# Patient Record
Sex: MALE | Race: WHITE | NOT HISPANIC OR LATINO | Employment: OTHER | URBAN - METROPOLITAN AREA
[De-identification: names, ages, dates, MRNs, and addresses within clinical notes are randomized per-mention and may not be internally consistent; named-entity substitution may affect disease eponyms.]

---

## 2024-05-21 DIAGNOSIS — Z98.890 H/O EXCISION OF LAMINA OF LUMBAR VERTEBRA FOR DECOMPRESSION OF SPINAL CORD: ICD-10-CM

## 2024-05-21 DIAGNOSIS — Q05.9 MYELOMENINGOCELE (MULTI): Primary | ICD-10-CM

## 2024-05-21 DIAGNOSIS — M54.42 CHRONIC LOW BACK PAIN WITH LEFT-SIDED SCIATICA, UNSPECIFIED BACK PAIN LATERALITY: ICD-10-CM

## 2024-05-21 DIAGNOSIS — G89.29 CHRONIC LOW BACK PAIN WITH LEFT-SIDED SCIATICA, UNSPECIFIED BACK PAIN LATERALITY: ICD-10-CM

## 2024-06-04 ENCOUNTER — HOSPITAL ENCOUNTER (OUTPATIENT)
Dept: RADIOLOGY | Facility: HOSPITAL | Age: 59
Discharge: HOME | End: 2024-06-04
Payer: MEDICARE

## 2024-06-04 DIAGNOSIS — Z98.890 H/O EXCISION OF LAMINA OF LUMBAR VERTEBRA FOR DECOMPRESSION OF SPINAL CORD: ICD-10-CM

## 2024-06-04 DIAGNOSIS — M54.42 CHRONIC LOW BACK PAIN WITH LEFT-SIDED SCIATICA, UNSPECIFIED BACK PAIN LATERALITY: ICD-10-CM

## 2024-06-04 DIAGNOSIS — Q05.9 MYELOMENINGOCELE (MULTI): ICD-10-CM

## 2024-06-04 DIAGNOSIS — G89.29 CHRONIC LOW BACK PAIN WITH LEFT-SIDED SCIATICA, UNSPECIFIED BACK PAIN LATERALITY: ICD-10-CM

## 2024-06-04 PROCEDURE — 72192 CT PELVIS W/O DYE: CPT | Performed by: RADIOLOGY

## 2024-06-04 PROCEDURE — 72082 X-RAY EXAM ENTIRE SPI 2/3 VW: CPT | Mod: MUE

## 2024-06-04 PROCEDURE — 77073 BONE LENGTH STUDIES: CPT | Performed by: STUDENT IN AN ORGANIZED HEALTH CARE EDUCATION/TRAINING PROGRAM

## 2024-06-04 PROCEDURE — 72082 X-RAY EXAM ENTIRE SPI 2/3 VW: CPT

## 2024-06-04 PROCEDURE — 72192 CT PELVIS W/O DYE: CPT

## 2024-06-11 ENCOUNTER — OFFICE VISIT (OUTPATIENT)
Dept: NEUROSURGERY | Facility: CLINIC | Age: 59
End: 2024-06-11
Payer: MEDICARE

## 2024-06-11 VITALS — HEIGHT: 70 IN | WEIGHT: 210 LBS | HEART RATE: 96 BPM | TEMPERATURE: 97.8 F | BODY MASS INDEX: 30.06 KG/M2

## 2024-06-11 DIAGNOSIS — M53.3 SACROILIAC JOINT DYSFUNCTION OF BOTH SIDES: ICD-10-CM

## 2024-06-11 DIAGNOSIS — R29.898 WEAKNESS OF LEFT LOWER EXTREMITY: Primary | ICD-10-CM

## 2024-06-11 DIAGNOSIS — M54.16 LEFT LUMBAR RADICULOPATHY: ICD-10-CM

## 2024-06-11 PROCEDURE — 1036F TOBACCO NON-USER: CPT | Performed by: NEUROLOGICAL SURGERY

## 2024-06-11 PROCEDURE — 99214 OFFICE O/P EST MOD 30 MIN: CPT | Performed by: NEUROLOGICAL SURGERY

## 2024-06-11 RX ORDER — TEMAZEPAM 30 MG/1
CAPSULE ORAL NIGHTLY PRN
COMMUNITY
Start: 2023-09-27

## 2024-06-11 RX ORDER — CYCLOBENZAPRINE HCL 10 MG
10 TABLET ORAL 2 TIMES DAILY PRN
COMMUNITY
Start: 2023-09-27

## 2024-06-11 RX ORDER — METOPROLOL SUCCINATE 25 MG/1
25 TABLET, EXTENDED RELEASE ORAL
COMMUNITY
Start: 2024-04-11

## 2024-06-11 RX ORDER — ALLOPURINOL 300 MG/1
300 TABLET ORAL
COMMUNITY
Start: 2024-04-11

## 2024-06-11 RX ORDER — LEVOTHYROXINE SODIUM 50 UG/1
50 TABLET ORAL
COMMUNITY
Start: 2024-04-11

## 2024-06-11 RX ORDER — DICLOFENAC SODIUM 100 MG/1
1 TABLET, EXTENDED RELEASE ORAL
COMMUNITY
Start: 2024-04-20

## 2024-06-11 RX ORDER — ROSUVASTATIN CALCIUM 20 MG/1
1 TABLET, COATED ORAL
COMMUNITY
Start: 2024-04-20

## 2024-06-11 RX ORDER — LEVOTHYROXINE SODIUM 25 UG/1
25 TABLET ORAL
COMMUNITY
Start: 2024-04-11

## 2024-06-11 RX ORDER — AMLODIPINE BESYLATE 5 MG/1
5 TABLET ORAL 2 TIMES DAILY
COMMUNITY
Start: 2022-12-06

## 2024-06-11 ASSESSMENT — PATIENT HEALTH QUESTIONNAIRE - PHQ9
1. LITTLE INTEREST OR PLEASURE IN DOING THINGS: NOT AT ALL
SUM OF ALL RESPONSES TO PHQ9 QUESTIONS 1 & 2: 0
2. FEELING DOWN, DEPRESSED OR HOPELESS: NOT AT ALL

## 2024-06-11 ASSESSMENT — PAIN SCALES - GENERAL: PAINLEVEL: 9

## 2024-06-11 NOTE — PROGRESS NOTES
It was a pleasure to see Mr. Dow at the [facility] at [facility].     Patient is a 59-year-old male with prior history of myelomeningocele status postrepair with chronic left lower extremity weakness and bilateral lower extremity distal weakness who presents to see us after a few years with back pain and lower extremity radicular complaints.  Majority issues he states are in the left lower extremity but he also has some right lower extremity radicular complaints recently.  He feels that his left leg is weaker than it was before.  He has chronic pain complaints in bilateral upper extremity as well and neck pain.  He has been experiencing a intermittent popping sensation in his lower back on both sides.  This tends to occur when he is going from sitting to standing.  There is some associated pain with this.    Part of this patient’s history is from personal review of the patient’s previous charts. I also personally reviewed the past imaging and interpreted it myself.    Physical Exam:  General: Well developed, awake/alert/oriented x3, no distress, alert and cooperative    Skin: Warm and dry, no lesions, no rashes    ENMT: Mucous membranes moist, no apparent injury, no lesions seen    Head/Neck: Neck Supple, no apparent injury    Respiratory/Thorax: Normal breath sounds with good chest expansion, thorax symmetric    Cardiovascular: No pitting edema, no JVD    Motor strength in bilateral upper extremities 4+ out of 5.  Right lower extremity proximally is 4 out of 5 distally 0 out of 5.  Left lower extremity has a brace in place with some knee extension movement otherwise he is 0 out of 5.  He uses crutches at baseline.    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    Standing EOS films do not show global malalignment taking into consideration patient's left lower extremity  issues.    Assessment and Plan:   Patient is a 59-year-old male with prior history of myelomeningocele repair who presents to see us for back pain and lower EXTR radicular issues.  At this point we do not have any updated imaging to evaluate to see if he has any persistent spinal cord compression given his prior cervical spine surgery or low lumbar compression explaining his radicular issues.  At this point we will obtain MRI of the cervical thoracic and lumbar spine. We will also get EMG studies for left lower extremity weakness. We will also get an physical therapy prescription.  If the popping sensation returns again, we may consider referring him for sacroiliac joint injections.  All questions were answered.

## 2024-07-10 ENCOUNTER — APPOINTMENT (OUTPATIENT)
Dept: RADIOLOGY | Facility: CLINIC | Age: 59
End: 2024-07-10
Payer: MEDICARE

## 2024-07-10 ENCOUNTER — EVALUATION (OUTPATIENT)
Dept: PHYSICAL THERAPY | Facility: CLINIC | Age: 59
End: 2024-07-10
Payer: MEDICARE

## 2024-07-10 DIAGNOSIS — M25.511 CHRONIC PAIN OF BOTH SHOULDERS: Primary | ICD-10-CM

## 2024-07-10 DIAGNOSIS — M25.512 CHRONIC PAIN OF BOTH SHOULDERS: Primary | ICD-10-CM

## 2024-07-10 DIAGNOSIS — R29.898 WEAKNESS OF LEFT LOWER EXTREMITY: ICD-10-CM

## 2024-07-10 DIAGNOSIS — M12.812 OTHER SPECIFIC ARTHROPATHIES, NOT ELSEWHERE CLASSIFIED, LEFT SHOULDER: ICD-10-CM

## 2024-07-10 DIAGNOSIS — G89.29 CHRONIC PAIN OF BOTH SHOULDERS: Primary | ICD-10-CM

## 2024-07-10 PROCEDURE — 97110 THERAPEUTIC EXERCISES: CPT | Mod: GP | Performed by: PHYSICAL THERAPIST

## 2024-07-10 PROCEDURE — 97163 PT EVAL HIGH COMPLEX 45 MIN: CPT | Mod: GP | Performed by: PHYSICAL THERAPIST

## 2024-07-10 ASSESSMENT — COLUMBIA-SUICIDE SEVERITY RATING SCALE - C-SSRS
1. IN THE PAST MONTH, HAVE YOU WISHED YOU WERE DEAD OR WISHED YOU COULD GO TO SLEEP AND NOT WAKE UP?: NO
2. HAVE YOU ACTUALLY HAD ANY THOUGHTS OF KILLING YOURSELF?: NO
6. HAVE YOU EVER DONE ANYTHING, STARTED TO DO ANYTHING, OR PREPARED TO DO ANYTHING TO END YOUR LIFE?: NO

## 2024-07-10 ASSESSMENT — PATIENT HEALTH QUESTIONNAIRE - PHQ9
2. FEELING DOWN, DEPRESSED OR HOPELESS: NOT AT ALL
SUM OF ALL RESPONSES TO PHQ9 QUESTIONS 1 AND 2: 0
1. LITTLE INTEREST OR PLEASURE IN DOING THINGS: NOT AT ALL

## 2024-07-10 ASSESSMENT — ENCOUNTER SYMPTOMS
LOSS OF SENSATION IN FEET: 1
DEPRESSION: 0
OCCASIONAL FEELINGS OF UNSTEADINESS: 1

## 2024-07-10 NOTE — PROGRESS NOTES
PHYSICAL THERAPY   EVALUATION & TREATMENT & DISCHARGE    Patient Name:  Romulo Dow   Patient MRN: [id number]  Date: 7/10/2024    Time Calculation  Start Time: 0120  Stop Time: 0230  Time Calculation (min): 70 min    Insurance:  Insurance Type: [place] [employer] /[employer]  Visit number: 1  Approved # of visits ?  Authorization Needed: Yes  Cert Date: ?     General   Reason for visit: B shoulder pain R>L, L LE weakness  Referred by: Torsten / Corina Lew diagnoses:   Problem List Items Addressed This Visit             ICD-10-CM    Weakness of left lower extremity R29.898    Relevant Orders    Follow Up In Physical Therapy    Chronic pain of both shoulders - Primary M25.511, G89.29, M25.512    Relevant Orders    Follow Up In Physical Therapy     Other Visit Diagnoses         Codes    Other specific arthropathies, not elsewhere classified, left shoulder     M12.812            ASSESSMENT   60 y/o male with spinal bifida and complex surgical history c/o B shoulder pain R>L, and LE weakness L >R, presenting to PT today on forearm crutches and B LE bracing, with MRI showing multiple full thickness R RTC tears and plans for full spinal MRI Friday. He demonstrates severely altered gait pattern, decreased strength in midback, affecting his ability to perform most of his ADLs. He admits that he is not in a place currently to be consistent with PT attendance and getting to the facility would be too hard for him on a regular basis. He is not appropriate for orthopedic therapy at this time - should follow up with neuro PT to address spine / LE weakness and was given HEP to address shoulder weakness in salvage manner.     PLAN   Discharge today with HEP. Follow up with neuro PT when able to be consistent.  Patient agrees to plan of care.    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 Was having spinal issues for years and now has 3 spinal MRIs scheduled, has an EMG also scheduled. Also has B shoulder issues and weakness, mid Feb he got weak enough in the R shoulder that he couldn't even lift a coffee cup. Was refinishing hardwood floors at the time, was scrubbing by hand and that seemed to put him over the edge. Has done PT in the past but his case is too complex and he has too much going on for him to be consistent. Doctor doesn't believe any kind of surgical intervention is an option for his R shoulder due to his gait pattern.   MRI R shoulder - long head biceps tear, full thickness tear of supra, infra, subscap, and labrum    Pain:  R shoulder with N/T down his UE. L side isn't as bad but same symptoms. Neck pain at times   At worst, pain is 8/10  Exacerbating factors include laying down at night    Function:  Can't hold his arm up enough to comb his hair, cleans less than he used to.  Sleep - has a hard time laying on his R shoulder so it wakes him up at night.  Lives alone.   Baseline function: Prior to Feb was doing better but still had some shoulder pain and weakness, but February pushed it over the edge.    Work: disability    Social:   Exercise - none right now, but is a member at the [employer]    Pt goals: strengthen the muscles in his arms to compensate for his shoulder degeneration, strengthen his legs     Language: English  Potential barriers to treatment: pt feels limited in being consistent with his HEP.     Precautions:    PMH: Back surgery 2020 and another prior. RTC tear 25 years ago that he's lived with, spina bifida with R AFO and L KAFO  Fall risk -  high      OBJECTIVE *=painful  Gait Ambulates with B forearm crutches and L LE brace and L leg circumduction    Palpation  (+) TTP R biceps and shoulder    Sensation  Decreased to light touch R thumb  Minimal sensation to light touch to lower legs    Range of Motion (R, L in degrees)  Shoulder Flexion WNL  B  Shoulder Abduction WNL B with pain and depressed shoulder   Hand Up Back  T8, T8  Shoulder ER AROM limited    Strength (R, L MMT out of 5)  Shoulder Flexion 4*, 5  Shoulder Abduction 4*, 5  Shoulder IR 5, 5  Shoulder ER 3*, 5  Elbow Flexion 3+*, 5  Hip Flexion 3+, 3    Outcome Measures  Quick DASH = 29/55    Today's treatment and initial evaluation included:  - Patient education regarding diagnosis, prognosis, contributing factors, comorbidities, activity modification, symptom monitoring, importance of HEP, role of PT, postural re-education, body mechanics, reviewed office cancel/no show policy.  - Review of POC   - Therapeutic Exercise & given HEP handout & yellow, blue, and green band:  Access Code: [email or web address]  - Seated Shoulder Horizontal Abduction with Resistance  - 1 x daily - 2 sets - 15 reps  - Standing Shoulder External Rotation with Resistance  - 1 x daily - 2 sets - 15 reps  - Standing Shoulder Row with Anchored Resistance  - 1 x daily - 2 sets - 15 reps  - Shoulder extension with resistance - Neutral  - 1 x daily - 2 sets - 15 reps  - Seated Shoulder Scaption  - 1 x daily - 2 sets - 15 reps  - Supine March  - 1 x daily - 2 sets - 10 reps  - Supine Bridge  - 1 x daily - 2 sets - 10 reps  PT Evaluation Time Entry  PT Evaluation (Complex) Time Entry: 45  PT Therapeutic Procedures Time Entry  Therapeutic Exercise Time Entry: 25

## 2024-07-12 ENCOUNTER — HOSPITAL ENCOUNTER (OUTPATIENT)
Dept: RADIOLOGY | Facility: CLINIC | Age: 59
Discharge: HOME | End: 2024-07-12
Payer: MEDICARE

## 2024-07-12 DIAGNOSIS — R29.898 WEAKNESS OF LEFT LOWER EXTREMITY: ICD-10-CM

## 2024-07-12 PROCEDURE — 72146 MRI CHEST SPINE W/O DYE: CPT

## 2024-07-12 PROCEDURE — 72148 MRI LUMBAR SPINE W/O DYE: CPT

## 2024-07-12 PROCEDURE — 72141 MRI NECK SPINE W/O DYE: CPT

## 2024-09-17 ENCOUNTER — APPOINTMENT (OUTPATIENT)
Dept: NEUROSURGERY | Facility: CLINIC | Age: 59
End: 2024-09-17
Payer: MEDICARE

## 2024-09-17 VITALS — HEIGHT: 70 IN | WEIGHT: 200 LBS | HEART RATE: 84 BPM | TEMPERATURE: 96.9 F | BODY MASS INDEX: 28.63 KG/M2

## 2024-09-17 DIAGNOSIS — M54.16 LUMBAR RADICULOPATHY: ICD-10-CM

## 2024-09-17 DIAGNOSIS — M48.061 DEGENERATIVE LUMBAR SPINAL STENOSIS: Primary | ICD-10-CM

## 2024-09-17 RX ORDER — EZETIMIBE 10 MG/1
10 TABLET ORAL DAILY
COMMUNITY
Start: 2024-09-03

## 2024-09-17 ASSESSMENT — PATIENT HEALTH QUESTIONNAIRE - PHQ9
SUM OF ALL RESPONSES TO PHQ9 QUESTIONS 1 & 2: 3
8. MOVING OR SPEAKING SO SLOWLY THAT OTHER PEOPLE COULD HAVE NOTICED. OR THE OPPOSITE, BEING SO FIGETY OR RESTLESS THAT YOU HAVE BEEN MOVING AROUND A LOT MORE THAN USUAL: NOT AT ALL
5. POOR APPETITE OR OVEREATING: NOT AT ALL
6. FEELING BAD ABOUT YOURSELF - OR THAT YOU ARE A FAILURE OR HAVE LET YOURSELF OR YOUR FAMILY DOWN: NOT AT ALL
SUM OF ALL RESPONSES TO PHQ QUESTIONS 1-9: 6
4. FEELING TIRED OR HAVING LITTLE ENERGY: SEVERAL DAYS
3. TROUBLE FALLING OR STAYING ASLEEP: MORE THAN HALF THE DAYS
7. TROUBLE CONCENTRATING ON THINGS, SUCH AS READING THE NEWSPAPER OR WATCHING TELEVISION: NOT AT ALL
9. THOUGHTS THAT YOU WOULD BE BETTER OFF DEAD, OR OF HURTING YOURSELF: NOT AT ALL
1. LITTLE INTEREST OR PLEASURE IN DOING THINGS: MORE THAN HALF THE DAYS
10. IF YOU CHECKED OFF ANY PROBLEMS, HOW DIFFICULT HAVE THESE PROBLEMS MADE IT FOR YOU TO DO YOUR WORK, TAKE CARE OF THINGS AT HOME, OR GET ALONG WITH OTHER PEOPLE: SOMEWHAT DIFFICULT
2. FEELING DOWN, DEPRESSED OR HOPELESS: SEVERAL DAYS

## 2024-09-17 ASSESSMENT — PAIN SCALES - GENERAL: PAINLEVEL: 4

## 2024-09-17 NOTE — PROGRESS NOTES
[facility] [place]  Department of Neurological Surgery  Established Patient Visit    History of Present Illness:  Romulo Dow is a 59-year-old male with prior history of myelomeningocele status postrepair with chronic left lower extremity weakness and bilateral lower extremity distal weakness who presents to see us after a few years with back pain and lower extremity radicular complaints. Majority issues he states are in the left lower extremity but he also has some right lower extremity radicular complaints recently. He feels that his left leg is weaker than it was before.     EMG showing moderately severe and chronic proximal L1-L3 denervation right greater than left, tibialis anterior, gastroc anemias and EHL degeneration of both sides, superficial peroneal and sural nerve denervation.    MRI C-spine showing prior C3-4 ACDF with C4-5 and C5-6 moderate to severe foraminal stenosis with moderate central canal stenosis and no T2 signal change, no cord compression    MRI T-spine with no significant canal stenosis.     MRI L-spine showin multi level spondylosis worst at L2-3 with severe canal and foraminal stenosis and evidence of prior spinous process resection.  Severe L3-4 foraminal and lateral recess stenosis, degenerative canal and subsequent nerve roots.  Severe bilateral L4-L5 foraminal stenosis.  There is also tethering of the conus to the cyst at the caudal aspect.  The the first well-formed disc at the caudal aspect is L5-S1 level with rudimentary disc just caudal to that at the S1-S2 level.  He has x-ray showing evidence of transitional anatomy and rudimentary disc at S1-S2.  Evidence of hip flexion and pelvic retroversion.    Patient's BMI is Body mass index is 28.7 kg/m².    Diabetic: No      Osteoporosis: No  No DXA results found for the past 12 months    Review of Systems:  14/14 systems reviewed and negative other than what is listed in the history of present illness    Patient  Active Problem List   Diagnosis    Weakness of left lower extremity    Chronic pain of both shoulders     No past medical history on file.  No past surgical history on file.  Social History     Tobacco Use    Smoking status: Never    Smokeless tobacco: Never   Substance Use Topics    Alcohol use: Yes     Comment: Socially     family history is not on file.    Current Outpatient Medications:     allopurinol (Zyloprim) 300 mg tablet, Take 1 tablet (300 mg) by mouth once daily., Disp: , Rfl:     amLODIPine (Norvasc) 5 mg tablet, Take 1 tablet (5 mg) by mouth twice a day., Disp: , Rfl:     cyclobenzaprine (Flexeril) 10 mg tablet, Take 1 tablet (10 mg) by mouth 2 times a day as needed., Disp: , Rfl:     diclofenac sodium (Voltaren XR) 100 mg 24 hr tablet, Take 1 tablet (100 mg) by mouth every 12 hours., Disp: , Rfl:     ezetimibe (Zetia) 10 mg tablet, Take 1 tablet (10 mg) by mouth once daily., Disp: , Rfl:     levothyroxine (Synthroid, Levoxyl) 25 mcg tablet, Take 1 tablet (25 mcg) by mouth once daily., Disp: , Rfl:     levothyroxine (Synthroid, Levoxyl) 50 mcg tablet, Take 1 tablet (50 mcg) by mouth once daily., Disp: , Rfl:     metoprolol succinate XL (Toprol-XL) 25 mg 24 hr tablet, Take 1 tablet (25 mg) by mouth once daily., Disp: , Rfl:     temazepam (Restoril) 30 mg capsule, Take by mouth as needed at bedtime., Disp: , Rfl:     rosuvastatin (Crestor) 20 mg tablet, Take 1 tablet (20 mg) by mouth early in the morning.., Disp: , Rfl:   No Known Allergies    Physical Examination:  General: Well developed, awake/alert/oriented x3, no distress, alert and cooperative     Skin: Warm and dry, no lesions, no rashes     ENMT: Mucous membranes moist, no apparent injury, no lesions seen     Head/Neck: Neck Supple, no apparent injury     Respiratory/Thorax: Normal breath sounds with good chest expansion, thorax symmetric     Cardiovascular: No pitting edema, no JVD     Motor strength in bilateral upper extremities 4+ out of 5.   Right lower extremity proximally is 4 out of 5 distally 0 out of 5.  Left lower extremity has a brace in place with some knee extension movement otherwise he is 0 out of 5.  He uses crutches at baseline.    Results:  I personally reviewed and interpreted the imaging results which included EMG showing moderately severe and chronic proximal L1-L3 denervation right greater than left, tibialis anterior, gastroc anemias and EHL degeneration of both sides, superficial peroneal and sural nerve denervation.    MRI C-spine showing prior C3-4 ACDF with C4-5 and C5-6 moderate to severe foraminal stenosis with moderate central canal stenosis and no T2 signal change, no cord compression    MRI T-spine with no significant canal stenosis.     MRI L-spine showin multi level spondylosis worst at L2-3 with severe canal and foraminal stenosis and evidence of prior spinous process resection.  Severe L3-4 foraminal and lateral recess stenosis, degenerative canal and subsequent nerve roots.  Severe L4-5 foraminal stenosis bilaterally.  Tethering of the conus to the cyst at the caudal aspect.  The the first well-formed disc at the caudal aspect is L5-S1 level with rudimentary disc just caudal to that at the S1-S2 level.  He has x-ray showing evidence of transitional anatomy and rudimentary disc at S1-S2.  Evidence of hip flexion and pelvic retroversion.    Assessment and Plan:      Romulo Dow is a 59 y.o. year old male who presents to the spine clinic in follow up with low back and bilateral lower extremity pain.  His EMG shows evidence of moderate to severe bilateral L1-3 radiculopathy.  His imaging shows severe central stenosis at L2-3 and L3-4, as well as severe bilateral foraminal stenosis at L2-3, L3-4 and L4-5.  I had a lengthy discussion with the patient regarding his condition and treatment options.  I discussed both conservative and surgical approaches.  I told him that he may be a candidate for an L2-5 decompression and  fusion to address both his central and foraminal stenosis.  He has been to think about his options and will let me know if and when he is interested in surgery.  He will follow-up on an as-needed basis.

## 2025-05-19 ENCOUNTER — HOSPITAL ENCOUNTER (EMERGENCY)
Facility: HOSPITAL | Age: 60
Discharge: HOME | End: 2025-05-19
Payer: MEDICARE

## 2025-05-19 ENCOUNTER — APPOINTMENT (OUTPATIENT)
Dept: RADIOLOGY | Facility: HOSPITAL | Age: 60
End: 2025-05-19
Payer: MEDICARE

## 2025-05-19 VITALS
BODY MASS INDEX: 30.31 KG/M2 | WEIGHT: 200 LBS | TEMPERATURE: 98.8 F | SYSTOLIC BLOOD PRESSURE: 159 MMHG | HEART RATE: 80 BPM | HEIGHT: 68 IN | DIASTOLIC BLOOD PRESSURE: 77 MMHG | RESPIRATION RATE: 18 BRPM | OXYGEN SATURATION: 97 %

## 2025-05-19 LAB
ALBUMIN SERPL BCP-MCNC: 4.5 G/DL (ref 3.4–5)
ALP SERPL-CCNC: 83 U/L (ref 33–136)
ALT SERPL W P-5'-P-CCNC: 18 U/L (ref 10–52)
ANION GAP SERPL CALC-SCNC: 10 MMOL/L (ref 10–20)
AST SERPL W P-5'-P-CCNC: 23 U/L (ref 9–39)
BASOPHILS # BLD AUTO: 0.03 X10*3/UL (ref 0–0.1)
BASOPHILS NFR BLD AUTO: 0.6 %
BILIRUB SERPL-MCNC: 0.3 MG/DL (ref 0–1.2)
BUN SERPL-MCNC: 19 MG/DL (ref 6–23)
CALCIUM SERPL-MCNC: 9.1 MG/DL (ref 8.6–10.3)
CHLORIDE SERPL-SCNC: 104 MMOL/L (ref 98–107)
CO2 SERPL-SCNC: 29 MMOL/L (ref 21–32)
CREAT SERPL-MCNC: 0.83 MG/DL (ref 0.5–1.3)
EGFRCR SERPLBLD CKD-EPI 2021: >90 ML/MIN/1.73M*2
EOSINOPHIL # BLD AUTO: 0.07 X10*3/UL (ref 0–0.7)
EOSINOPHIL NFR BLD AUTO: 1.5 %
ERYTHROCYTE [DISTWIDTH] IN BLOOD BY AUTOMATED COUNT: 13.1 % (ref 11.5–14.5)
GLUCOSE SERPL-MCNC: 91 MG/DL (ref 74–99)
HCT VFR BLD AUTO: 43.5 % (ref 41–52)
HGB BLD-MCNC: 13.9 G/DL (ref 13.5–17.5)
IMM GRANULOCYTES # BLD AUTO: 0.01 X10*3/UL (ref 0–0.7)
IMM GRANULOCYTES NFR BLD AUTO: 0.2 % (ref 0–0.9)
INR PPP: 0.9 (ref 0.9–1.1)
LYMPHOCYTES # BLD AUTO: 1.16 X10*3/UL (ref 1.2–4.8)
LYMPHOCYTES NFR BLD AUTO: 24.8 %
MCH RBC QN AUTO: 31.1 PG (ref 26–34)
MCHC RBC AUTO-ENTMCNC: 32 G/DL (ref 32–36)
MCV RBC AUTO: 97 FL (ref 80–100)
MONOCYTES # BLD AUTO: 0.52 X10*3/UL (ref 0.1–1)
MONOCYTES NFR BLD AUTO: 11.1 %
NEUTROPHILS # BLD AUTO: 2.88 X10*3/UL (ref 1.2–7.7)
NEUTROPHILS NFR BLD AUTO: 61.8 %
NRBC BLD-RTO: 0 /100 WBCS (ref 0–0)
PLATELET # BLD AUTO: 260 X10*3/UL (ref 150–450)
POTASSIUM SERPL-SCNC: 3.9 MMOL/L (ref 3.5–5.3)
PROT SERPL-MCNC: 7.8 G/DL (ref 6.4–8.2)
PROTHROMBIN TIME: 9.6 SECONDS (ref 9.8–12.4)
RBC # BLD AUTO: 4.47 X10*6/UL (ref 4.5–5.9)
SODIUM SERPL-SCNC: 139 MMOL/L (ref 136–145)
WBC # BLD AUTO: 4.7 X10*3/UL (ref 4.4–11.3)

## 2025-05-19 PROCEDURE — 85025 COMPLETE CBC W/AUTO DIFF WBC: CPT | Performed by: NURSE PRACTITIONER

## 2025-05-19 PROCEDURE — 80053 COMPREHEN METABOLIC PANEL: CPT | Performed by: NURSE PRACTITIONER

## 2025-05-19 PROCEDURE — 36415 COLL VENOUS BLD VENIPUNCTURE: CPT | Performed by: NURSE PRACTITIONER

## 2025-05-19 PROCEDURE — 74176 CT ABD & PELVIS W/O CONTRAST: CPT

## 2025-05-19 PROCEDURE — 74176 CT ABD & PELVIS W/O CONTRAST: CPT | Performed by: RADIOLOGY

## 2025-05-19 PROCEDURE — 85610 PROTHROMBIN TIME: CPT | Performed by: NURSE PRACTITIONER

## 2025-05-19 PROCEDURE — 99284 EMERGENCY DEPT VISIT MOD MDM: CPT | Mod: 25

## 2025-05-19 ASSESSMENT — COLUMBIA-SUICIDE SEVERITY RATING SCALE - C-SSRS
2. HAVE YOU ACTUALLY HAD ANY THOUGHTS OF KILLING YOURSELF?: NO
1. IN THE PAST MONTH, HAVE YOU WISHED YOU WERE DEAD OR WISHED YOU COULD GO TO SLEEP AND NOT WAKE UP?: NO
6. HAVE YOU EVER DONE ANYTHING, STARTED TO DO ANYTHING, OR PREPARED TO DO ANYTHING TO END YOUR LIFE?: NO

## 2025-05-19 ASSESSMENT — PAIN SCALES - GENERAL: PAINLEVEL_OUTOF10: 0 - NO PAIN

## 2025-05-19 ASSESSMENT — PAIN - FUNCTIONAL ASSESSMENT: PAIN_FUNCTIONAL_ASSESSMENT: 0-10

## 2025-05-19 NOTE — ED TRIAGE NOTES
TRIAGE NOTE   I saw the patient as the Clinician in Triage and performed a brief history and physical exam, established acuity, and ordered appropriate tests to develop basic plan of care. Patient will be seen by an JONAH, resident and/or physician who will independently evaluate the patient. Please see subsequent provider notes for further details and disposition.     Brief HPI: In brief, Romulo Dow is a 60 y.o. male with significant PMH for spina bifida, HTN, hypothyroidism, gout and urostomy presenting to ED today from home for evaluation of cecil hematuria.  For the last 3 days the patient noticed bright red blood in the urine comingfrom his urostomy.  No pain associated with the blood.  Has felt fatigued.  Minimal improvement with increasing fluids.  Did see some clots.  No use of blood thinners.  Denies fever/chills, cough/cold symptoms, chest pain, shortness of breath, nausea/vomiting, abdominal pain, change in bowel habits or any other complaints.  Occasional EtOH, no smoking or drug use.  PCP is Dr. Teague.     Focused Physical exam:   General: 60-year-old male awake and alert, oriented x 3.  Well-nourished and hydrated.  Nontoxic looking.  Skin: Pink, warm and dry.  Cardiac: Regular rate and rhythm  Pulmonary: Clear bilaterally.  Abdomen: Rounded and soft with bowel sounds, nontender.  Pink-tinged urine coming from urostomy.    Plan/MDM:    60 y.o. male with significant PMH for spina bifida, HTN, hypothyroidism, gout and urostomy he is evaluated at the bedside for bright red blood that was noted in the urostomy bag with increasing fatigue for the last 3 days.  On arrival vital signs within normal limits.  Afebrile.  Lungs clear, abdomen soft and nontender.  Pink-tinged urine coming from urostomy.  IV established, will check basic labs, UA/urine culture and perform CT abdomen/pelvis without contrast in preparation for further evaluation in the main ED.  Patient is agreeable to this plan.    Please  see subsequent provider note for further details and disposition